# Patient Record
Sex: MALE | Race: WHITE | NOT HISPANIC OR LATINO | Employment: OTHER | URBAN - METROPOLITAN AREA
[De-identification: names, ages, dates, MRNs, and addresses within clinical notes are randomized per-mention and may not be internally consistent; named-entity substitution may affect disease eponyms.]

---
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
## 2019-01-18 ENCOUNTER — CONTACT LENS EXAM ESTABLISHED (OUTPATIENT)
Dept: URBAN - METROPOLITAN AREA CLINIC 35 | Facility: CLINIC | Age: 54
End: 2019-01-18

## 2019-01-18 DIAGNOSIS — H52.4: ICD-10-CM

## 2019-01-18 DIAGNOSIS — H52.13: ICD-10-CM

## 2019-01-18 PROCEDURE — 92310-2 LEVEL 2 CONTACT LENS MANAGEMENT

## 2019-01-18 PROCEDURE — 92014 COMPRE OPH EXAM EST PT 1/>: CPT

## 2019-01-18 PROCEDURE — 92015 DETERMINE REFRACTIVE STATE: CPT

## 2019-01-18 ASSESSMENT — VISUAL ACUITY
OS_CC: J2
OD_CC: J2
OS_CC: 20/25-1+1
OD_CC: 20/20-1

## 2019-01-18 ASSESSMENT — TONOMETRY
OD_IOP_MMHG: 17
OS_IOP_MMHG: 17

## 2019-02-07 ENCOUNTER — CONTACT LENS FOLLOW UP (OUTPATIENT)
Dept: URBAN - METROPOLITAN AREA CLINIC 35 | Facility: CLINIC | Age: 54
End: 2019-02-07

## 2019-02-07 DIAGNOSIS — H52.4: ICD-10-CM

## 2019-02-07 PROCEDURE — 92310F

## 2019-02-07 ASSESSMENT — VISUAL ACUITY
OS_CC: 20/25
OD_CC: 20/20+1

## 2020-02-03 ENCOUNTER — ESTABLISHED PATIENT (OUTPATIENT)
Dept: URBAN - METROPOLITAN AREA CLINIC 35 | Facility: CLINIC | Age: 55
End: 2020-02-03

## 2020-02-03 DIAGNOSIS — H40.013: ICD-10-CM

## 2020-02-03 DIAGNOSIS — H52.13: ICD-10-CM

## 2020-02-03 DIAGNOSIS — H52.4: ICD-10-CM

## 2020-02-03 PROCEDURE — 92014 COMPRE OPH EXAM EST PT 1/>: CPT

## 2020-02-03 PROCEDURE — 92133 CPTRZD OPH DX IMG PST SGM ON: CPT

## 2020-02-03 PROCEDURE — 92310-1 LEVEL 1 CONTACT LENS MANAGEMENT

## 2020-02-03 PROCEDURE — 92015 DETERMINE REFRACTIVE STATE: CPT

## 2020-02-03 ASSESSMENT — VISUAL ACUITY
OS_CC: J12 WITH CL
OS_CC: 20/25 WITH CL
OS_SC: J10
OD_SC: 20/400
OS_SC: 20/400
OD_SC: J10
OD_CC: J12 WITH CL

## 2020-02-03 ASSESSMENT — TONOMETRY
OS_IOP_MMHG: 21
OS_IOP_MMHG: 23
OD_IOP_MMHG: 27
OD_IOP_MMHG: 19

## 2021-02-04 ENCOUNTER — ESTABLISHED COMPREHENSIVE EXAM (OUTPATIENT)
Dept: URBAN - METROPOLITAN AREA CLINIC 35 | Facility: CLINIC | Age: 56
End: 2021-02-04

## 2021-02-04 DIAGNOSIS — H52.4: ICD-10-CM

## 2021-02-04 DIAGNOSIS — H52.13: ICD-10-CM

## 2021-02-04 PROCEDURE — 92310-1 LEVEL 1 CONTACT LENS MANAGEMENT

## 2021-02-04 PROCEDURE — 92015 DETERMINE REFRACTIVE STATE: CPT

## 2021-02-04 PROCEDURE — 92014 COMPRE OPH EXAM EST PT 1/>: CPT

## 2021-02-04 ASSESSMENT — TONOMETRY
OD_IOP_MMHG: 18
OS_IOP_MMHG: 19

## 2021-02-04 ASSESSMENT — VISUAL ACUITY
OS_CC: 20/25 CLS
OD_CC: 20/20-1 CLS

## 2022-02-07 ENCOUNTER — COMPREHENSIVE EXAM (OUTPATIENT)
Dept: URBAN - METROPOLITAN AREA CLINIC 35 | Facility: CLINIC | Age: 57
End: 2022-02-07

## 2022-02-07 DIAGNOSIS — H52.13: ICD-10-CM

## 2022-02-07 DIAGNOSIS — H40.013: ICD-10-CM

## 2022-02-07 PROCEDURE — 92310-1 LEVEL 1 CONTACT LENS MANAGEMENT

## 2022-02-07 PROCEDURE — 92133 CPTRZD OPH DX IMG PST SGM ON: CPT

## 2022-02-07 PROCEDURE — 92014 COMPRE OPH EXAM EST PT 1/>: CPT

## 2022-02-07 PROCEDURE — 92015 DETERMINE REFRACTIVE STATE: CPT

## 2022-02-07 ASSESSMENT — TONOMETRY
OS_IOP_MMHG: 22
OD_IOP_MMHG: 21

## 2022-02-07 ASSESSMENT — VISUAL ACUITY
OS_CC: 20/25
OU_CC: J2
OD_CC: 20/25
OU_CC: 20/20-

## 2024-02-09 ENCOUNTER — COMPREHENSIVE EXAM (OUTPATIENT)
Dept: URBAN - METROPOLITAN AREA CLINIC 35 | Facility: CLINIC | Age: 59
End: 2024-02-09

## 2024-02-09 DIAGNOSIS — Q14.1: ICD-10-CM

## 2024-02-09 DIAGNOSIS — H52.221: ICD-10-CM

## 2024-02-09 DIAGNOSIS — H52.4: ICD-10-CM

## 2024-02-09 DIAGNOSIS — H40.013: ICD-10-CM

## 2024-02-09 DIAGNOSIS — H52.13: ICD-10-CM

## 2024-02-09 PROCEDURE — 92133 CPTRZD OPH DX IMG PST SGM ON: CPT

## 2024-02-09 PROCEDURE — 92014 COMPRE OPH EXAM EST PT 1/>: CPT

## 2024-02-09 PROCEDURE — 92015 DETERMINE REFRACTIVE STATE: CPT

## 2024-02-09 ASSESSMENT — TONOMETRY
OS_IOP_MMHG: 28
OS_IOP_MMHG: 24
OD_IOP_MMHG: 23
OD_IOP_MMHG: 28

## 2024-02-09 ASSESSMENT — VISUAL ACUITY
OU_CC: 20/20 CL
OS_CC: 20/25 CL
OD_CC: 20/20-1 CL